# Patient Record
Sex: MALE | Race: WHITE | NOT HISPANIC OR LATINO | Employment: UNEMPLOYED | URBAN - METROPOLITAN AREA
[De-identification: names, ages, dates, MRNs, and addresses within clinical notes are randomized per-mention and may not be internally consistent; named-entity substitution may affect disease eponyms.]

---
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
## 2025-07-09 ENCOUNTER — OFFICE VISIT (OUTPATIENT)
Dept: URGENT CARE | Facility: MEDICAL CENTER | Age: 20
End: 2025-07-09
Payer: COMMERCIAL

## 2025-07-09 ENCOUNTER — APPOINTMENT (OUTPATIENT)
Dept: RADIOLOGY | Facility: MEDICAL CENTER | Age: 20
End: 2025-07-09
Attending: FAMILY MEDICINE
Payer: COMMERCIAL

## 2025-07-09 VITALS
SYSTOLIC BLOOD PRESSURE: 122 MMHG | OXYGEN SATURATION: 99 % | BODY MASS INDEX: 25.24 KG/M2 | TEMPERATURE: 98.1 F | RESPIRATION RATE: 16 BRPM | HEART RATE: 67 BPM | DIASTOLIC BLOOD PRESSURE: 72 MMHG | WEIGHT: 203 LBS | HEIGHT: 75 IN

## 2025-07-09 DIAGNOSIS — S83.92XA SPRAIN OF LEFT KNEE, INITIAL ENCOUNTER: Primary | ICD-10-CM

## 2025-07-09 DIAGNOSIS — S83.92XA SPRAIN OF LEFT KNEE, UNSPECIFIED LIGAMENT, INITIAL ENCOUNTER: ICD-10-CM

## 2025-07-09 DIAGNOSIS — S93.422A SPRAIN OF DELTOID LIGAMENT OF LEFT ANKLE, INITIAL ENCOUNTER: ICD-10-CM

## 2025-07-09 PROCEDURE — 73564 X-RAY EXAM KNEE 4 OR MORE: CPT

## 2025-07-09 PROCEDURE — S9088 SERVICES PROVIDED IN URGENT: HCPCS | Performed by: FAMILY MEDICINE

## 2025-07-09 PROCEDURE — 99203 OFFICE O/P NEW LOW 30 MIN: CPT | Performed by: FAMILY MEDICINE

## 2025-07-09 NOTE — PATIENT INSTRUCTIONS
Left knee sprain and left ankle sprain.  No obvious fracture on the left knee x-ray but I will call you if there are any additional findings by radiology.  For now continue weightbearing as tolerated with crutches.  Hinged knee brace for comfort and stability-as needed.  Left ankle Ace bandage only as needed.  May ice 15 minutes 3-4 times a day as needed 2 to knee and ankle joint.  Warm compress as needed for stiffness.  Ibuprofen over-the-counter-take as needed for pain.  If symptoms persist then you still have difficulty with extending her leg or putting weight then follow-up with orthopedics at the end of this week or next week.

## 2025-07-09 NOTE — PROGRESS NOTES
[facility]        NAME: Ric Melchor is a 20 y.o. male  : 2005    MRN: [id number]  DATE: 2025  TIME: 1:26 PM    Assessment and Plan   Sprain of left knee, initial encounter [S83.92XA]        Patient Instructions     Left knee sprain and left ankle sprain.  No obvious fracture on the left knee x-ray but I will call you if there are any additional findings by radiology.  For now continue weightbearing as tolerated with crutches.  Hinged knee brace for comfort and stability-as needed.  Left ankle Ace bandage only as needed.  May ice 15 minutes 3-4 times a day as needed 2 to knee and ankle joint.  Warm compress as needed for stiffness.  Ibuprofen over-the-counter-take as needed for pain.  If symptoms persist then you still have difficulty with extending her leg or putting weight then follow-up with orthopedics at the end of this week or next week.  Follow up with PCP in 3-5 days.  Proceed to  ER if symptoms worsen.    If tests have been performed at [facility], our office will contact you with results if changes need to be made to the care plan discussed with you at the visit.  You can review your full results on [facility].    Chief Complaint     Chief Complaint   Patient presents with    Fall     Happened yesterday.     Pt is having pain in left knee, ankle.   Treated with ice, no other treatment. Walking with crutches          History of Present Illness       Patient presents for evaluation of left knee and left ankle injury which occurred yesterday.  Patient reports a misstep off of a small platform he was standing on.  He feels that his knee buckled backwards and he fell onto his buttocks.  He reports no head or neck injuries.  No loss of consciousness.  No dizziness.  He has had difficulty with weightbearing on the left leg and significant pain with extension of his knee.  There was no overt swelling.  He noticed a small bruise over the superior anterior aspect of his knee.  No  "numbness and tingling in the left lower extremity.  Pain and tenderness is mostly behind the knee and deep in the knee.  Left ankle pain is mostly over the medial aspect.  No overt swelling of the ankle or foot.  No painful clicking or popping in either joint.  No catching or locking episodes.  He was able to get some crutches and is currently ambulating with toe-touch weightbearing.  No previous significant injuries to the knee or ankle.  He did not take any pain medications.  He used ice this morning.    Fall  Pertinent negatives include no numbness.       Review of Systems   Review of Systems   Musculoskeletal:  Positive for arthralgias and gait problem. Negative for joint swelling.   Skin:  Negative for wound.   Neurological:  Negative for weakness and numbness.         Current Medications     Current Medications[1]    Current Allergies     Allergies as of 07/09/2025    (No Known Allergies)            The following portions of the patient's history were reviewed and updated as appropriate: allergies, current medications, past family history, past medical history, past social history, past surgical history and problem list.     Past Medical History[2]    Past Surgical History[3]    Family History[4]      Medications have been verified.        Objective   /72   Pulse 67   Temp 98.1 °F (36.7 °C)   Resp 16   Ht 6' 3\" (1.905 m)   Wt 92.1 kg (203 lb)   SpO2 99%   BMI 25.37 kg/m²   No LMP for male patient.       Physical Exam     Physical Exam  Constitutional:       Appearance: He is not ill-appearing.     Musculoskeletal:      Comments: Left lower extremity with normal appearance.  No significant joint effusion or swelling in the left leg.  No calf swelling, tenderness or erythema.  Left knee with limited range of motion 15 degrees to full flexion.  Pain with attempted full extension.  Tenderness to palpation over the medial hamstring tendons, minimal at the lateral hamstring tendons at the knee joint.  " Mild tenderness at the popliteal fossa without fullness.  No joint line tenderness.  No tenderness over the quadriceps tendon, patella or patellar tendon.  No tibial tuberosity or tibial plateau tenderness.  Lovely's test is negative.  Anterior and posterior drawer is negative, patient is guarding too much with Lachman maneuver and will not extend his knee due to discomfort.  Left ankle with normal appearance, no significant swelling, ecchymosis, overlying skin changes or effusion.  There is tenderness to palpation over the medial malleolus and deltoid ligament.  No tenderness laterally or over the ATFL.  No Achilles tenderness, heel tenderness or midfoot tenderness.  Patient is able to move his toes and ankle without overt pain.  Strength about the ankle and toes is full.  No laxity with drawer testing, external or internal rotation of foot about the ankle joint.  Sensation intact light touch, foot is well-perfused.     Neurological:      Mental Status: He is alert.         Orthopedic injury treatment    Date/Time: 7/9/2025 8:00 AM    Performed by: John Monaco MD  Authorized by: John Monaco MD    Patient Location:  [facility] Protocol:  Consent given by: patient    Injury location:  Knee  Distal perfusion: normal    Neurological function: normal    Range of motion: reduced    Immobilization:  Brace (Hinged knee brace)  Neurological function: normal    Range of motion: unchanged    Patient tolerance:  Patient tolerated the procedure well with no immediate complications   Pt noted improved comfort and knee stability with hinged knee brace on.        Xray of left knee no obvious fracture/dislocation.                 [1]   Current Outpatient Medications:     albuterol (Ventolin HFA) 90 mcg/act inhaler, Inhale 2 puffs every 6 (six) hours as needed for wheezing, Disp: 18 g, Rfl: 5    ALOE PO, Take 200 mg by mouth in the morning (Patient not taking: Reported on 7/9/2025), Disp: , Rfl:      famotidine (PEPCID) 40 MG tablet, Take 1 tablet (40 mg total) by mouth daily at bedtime (Patient not taking: Reported on 7/9/2025), Disp: 30 tablet, Rfl: 3    Ginger, Zingiber officinalis, (GINGER PO), Take 520 mg by mouth (Patient not taking: Reported on 7/9/2025), Disp: , Rfl:     ondansetron (ZOFRAN) 4 mg tablet, Take 1 tablet (4 mg total) by mouth every 8 (eight) hours as needed for nausea or vomiting (Patient not taking: Reported on 7/9/2025), Disp: 20 tablet, Rfl: 0    saccharomyces boulardii (FLORASTOR) 250 mg capsule, Take 250 mg by mouth in the morning (Patient not taking: Reported on 7/9/2025), Disp: , Rfl:     sucralfate (CARAFATE) 1 g tablet, Take 1 tablet (1 g total) by mouth 4 (four) times a day, Disp: 120 tablet, Rfl: 1  [2]   Past Medical History:  Diagnosis Date    Abdominal pain     Change in bowel habits     Clostridium difficile diarrhea     Constipation     Pneumonia of right lower lobe due to infectious organism     Pseudomembranous enterocolitis     Last Assessed:3/28/2017   [3]   Past Surgical History:  Procedure Laterality Date    CIRCUMCISION      TONSILLECTOMY AND ADENOIDECTOMY     [4]   Family History  Problem Relation Name Age of Onset    No Known Problems Father      No Known Problems Mother      Diabetes Paternal Grandfather      Cancer Maternal Grandfather          prostate    Diabetes Paternal Grandmother      Cancer Family          breast, lung

## 2025-07-09 NOTE — LETTER
July 9, 2025     Patient: Ric Melchor   YOB: 2005   Date of Visit: 7/9/2025       To Whom it May Concern:    Ric Melchor was seen in my clinic on 7/9/2025. He may return to work on 07/14/2025.      If you have any questions or concerns, please don't hesitate to call.         Sincerely,          John Monaco MD        CC: No Recipients

## 2025-07-11 ENCOUNTER — OFFICE VISIT (OUTPATIENT)
Dept: FAMILY MEDICINE CLINIC | Facility: CLINIC | Age: 20
End: 2025-07-11
Payer: COMMERCIAL

## 2025-07-11 VITALS
OXYGEN SATURATION: 99 % | HEIGHT: 75 IN | SYSTOLIC BLOOD PRESSURE: 114 MMHG | WEIGHT: 207.6 LBS | DIASTOLIC BLOOD PRESSURE: 62 MMHG | BODY MASS INDEX: 25.81 KG/M2 | HEART RATE: 63 BPM | TEMPERATURE: 98.2 F

## 2025-07-11 DIAGNOSIS — S93.402D SPRAIN OF LEFT ANKLE, UNSPECIFIED LIGAMENT, SUBSEQUENT ENCOUNTER: ICD-10-CM

## 2025-07-11 DIAGNOSIS — S83.92XD SPRAIN OF LEFT KNEE, UNSPECIFIED LIGAMENT, SUBSEQUENT ENCOUNTER: Primary | ICD-10-CM

## 2025-07-11 PROCEDURE — 99213 OFFICE O/P EST LOW 20 MIN: CPT | Performed by: FAMILY MEDICINE

## 2025-07-11 NOTE — PROGRESS NOTES
Name: Ric Melchor      : 2005      MRN: [id number]  Encounter Provider: Param Cardoso DO  Encounter Date: 2025   Encounter department: [facility]  :  Assessment & Plan  Sprain of left knee, unspecified ligament, subsequent encounter  Continue conservative care.  Will refer to orthopedics for evaluation and treatment.  I will let orthopedics decide what the next step of treatment would be.  I will let orthopedics decide if an MRI is necessary.  Patient is using crutches for ambulation assistance.  Orders:    Ambulatory Referral to Orthopedic Surgery; Future    Sprain of left ankle, unspecified ligament, subsequent encounter  Continue current treatment.              History of Present Illness   The patient is a pleasant 20-year-old male who presents today for evaluation of left knee hyperextension injury.  The patient was seen in urgent care.  They advised conservative care.  They provided him with a hinged knee brace.  Patient also has crutches for ambulation assistance.  He presents today with his mother.    Patient works for [employer].  [employer] seems to be giving him trouble that he is off.  I can absolutely say that it is my opinion he cannot go back to work at this time.  He needs to get better and see the orthopedic doctor.  Going back to work, puts him at risk for fall and further injury.  Current work excuse only extends him to 2025.  The patient does have paperwork that is going to be faxed to this office which I will fill out.      Review of Systems   Constitutional:  Negative for chills and fever.   HENT:  Negative for ear pain and sore throat.    Eyes:  Negative for pain and visual disturbance.   Respiratory:  Negative for cough and shortness of breath.    Cardiovascular:  Negative for chest pain and palpitations.   Gastrointestinal:  Negative for abdominal pain and vomiting.   Genitourinary:  Negative for dysuria and hematuria.   Musculoskeletal:  Positive for  "arthralgias (knee). Negative for back pain.   Skin:  Negative for color change and rash.   Neurological:  Negative for seizures and syncope.   All other systems reviewed and are negative.      Objective   /62   Pulse 63   Temp 98.2 °F (36.8 °C)   Ht 6' 3\" (1.905 m)   Wt 94.2 kg (207 lb 9.6 oz)   SpO2 99%   BMI 25.95 kg/m²      Physical Exam  Vitals and nursing note reviewed.   Constitutional:       General: He is not in acute distress.     Appearance: He is well-developed. He is not ill-appearing, toxic-appearing or diaphoretic.   HENT:      Head: Normocephalic and atraumatic.      Mouth/Throat:      Mouth: Mucous membranes are moist.     Eyes:      Conjunctiva/sclera: Conjunctivae normal.       Cardiovascular:      Rate and Rhythm: Normal rate and regular rhythm.      Heart sounds: No murmur heard.  Pulmonary:      Effort: Pulmonary effort is normal. No respiratory distress.      Breath sounds: Normal breath sounds.   Abdominal:      Palpations: Abdomen is soft.      Tenderness: There is no abdominal tenderness.     Musculoskeletal:         General: No swelling.      Cervical back: Neck supple.      Comments: Left knee with decreased range of motion in extension.  Tenderness to palpation bilateral joint line.  Equivocal laxity of ligaments.  Mild effusion.     Skin:     General: Skin is warm and dry.      Capillary Refill: Capillary refill takes less than 2 seconds.     Neurological:      General: No focal deficit present.      Mental Status: He is alert and oriented to person, place, and time.      Gait: Gait abnormal (Using crutches for ambulation assistance).     Psychiatric:         Mood and Affect: Mood normal.         Behavior: Behavior normal.         "

## 2025-07-16 ENCOUNTER — TELEPHONE (OUTPATIENT)
Age: 20
End: 2025-07-16

## 2025-07-16 ENCOUNTER — OFFICE VISIT (OUTPATIENT)
Dept: OBGYN CLINIC | Facility: CLINIC | Age: 20
End: 2025-07-16
Attending: FAMILY MEDICINE
Payer: COMMERCIAL

## 2025-07-16 VITALS
BODY MASS INDEX: 26.36 KG/M2 | WEIGHT: 212 LBS | OXYGEN SATURATION: 99 % | HEIGHT: 75 IN | HEART RATE: 80 BPM | TEMPERATURE: 99 F | RESPIRATION RATE: 16 BRPM

## 2025-07-16 DIAGNOSIS — S83.92XD SPRAIN OF LEFT KNEE, UNSPECIFIED LIGAMENT, SUBSEQUENT ENCOUNTER: ICD-10-CM

## 2025-07-16 PROCEDURE — 99244 OFF/OP CNSLTJ NEW/EST MOD 40: CPT | Performed by: STUDENT IN AN ORGANIZED HEALTH CARE EDUCATION/TRAINING PROGRAM

## 2025-07-16 NOTE — PROGRESS NOTES
Assessment & Plan  Sprain of left knee, unspecified ligament, subsequent encounter  Ric Melchor is a 20 y.o. year old male with new onset left knee pain.  We had a shared decision making discussion about their knee injury today.  Based on the history, physical exam, and current state of the knee, I am concerned for a ligamentous injury.  Therefore, I would like to proceed with an MRI.    Based on the outcome of the imaging and the recovery of the patient, we can discuss different treatment options which were outlined today.  Non-operative options such as rest and activity modification can be used in the short term, with icing and anti-inflammatories for pain control.  Physical therapy, whether at home or through formal therapy options can improve motion and function of the knee.   Depending on the results of the imaging, if it shows structural damage to the knee, then surgical options such as repair or reconstruction of the damaged structures can be considered if the patient wishes to proceed based on their goals and activity levels.  This can also be discussed in more detail in follow up visits.   They will follow up as discussed with the patient.    Orders:    Ambulatory Referral to Orthopedic Surgery    MRI knee left  wo contrast; Future    Ambulatory Referral to Physical Therapy; Future        General  Chief Complaint   Patient presents with    Left Knee - Pain    Left Ankle - Pain        Subjective    Ric Melchor is a 20 y.o. male who presents with LEFT knee pain:    Chief Complaint   Patient presents with    Left Knee - Pain    Left Ankle - Pain          History of Present Illness   The patient complains of left knee pain. The pain started a few days ago with injury. At that time the patient describes mis-stepping off a ledge at night where he fell approximately 1 foot and felt his knee buckle.  He was able to stand but had immediate pain and difficulty bearing weight.  He presented to urgent care where  x-rays were obtained he was diagnosed with a sprain and provided with a knee brace and crutches, subsequently saw his PCP who referred him to orthopedics.    The pain is 4/10. The pain is located Posterior. The pain is described as sharp. They have tried Bracing for their problem. Pain improves with rest. Pain worsens with attempted straightening of the knee.    The patient did not hear a pop. The patient does not have swelling.  The patient does not feel unstable.    Ortho Sports Medicine Patient Answers  Failed to redirect to the Timeline version of the [employer] SmartLink.  Allergies[1]  Encounter Medications[2]  Past Medical History[3]  Past Surgical History[4]  Family History[5]  Social History[6]        Objective      Vitals:    07/16/25 1206   Pulse: 80   Resp: 16   Temp: 99 °F (37.2 °C)   SpO2: 99%     Body mass index is 26.5 kg/m².  Physical Exam  Knee Exam     Right Left   Inspection: Without ecchymosis, wounds or prior incisions Without ecchymosis, wounds or prior incisions   Gait: Normal With Crutches   Quadriceps atrophy: None Moderate     Tenderness: None Popliteal fossa   ROM: 0-135 5-100   Effusion: None Trace   Meniscus Exam   Right Left   Medial Meniscus: None None   Lateral Meniscus: None None   Ligament Exam   Right Left   ACL Lachman: negative    Anterior Drawer:  negative Lachman: Unable    Anterior Drawer:Unable     PCL Posterior Drawer:negative     Posterior Drawer:Unable   MCL Stable at 0 and 30 degrees of flexion Stable at 0 and 30 degrees of flexion   LCL Stable at 0 and 30 degrees of flexion Stable at 0 and 30 degrees of flexion   PLC Deferred Deferred     Patellofemoral exam   Right Left   Patella grind test negative negative   Patella instability: negative  1 Quadrants of translation negative  1 Quadrants of translation   Patellar Translation Apprehension negative negative     Distally the patient's neurovascular status is normal.    Review of Prior Testing  I independently interpreted the  following test: X-rays of the left knee taken 7/9/2025, including weight bearing and merchant views.  Multiple views of the knee show the joint spaces to be maintained, no fractures, no malalignment.               Follow Up: Return in about 3 weeks (around 8/6/2025).    All questions answered and patient agrees with plan.             [1] No Known Allergies  [2]   Outpatient Encounter Medications as of 7/16/2025   Medication Sig Dispense Refill    albuterol (Ventolin HFA) 90 mcg/act inhaler Inhale 2 puffs every 6 (six) hours as needed for wheezing 18 g 5    sucralfate (CARAFATE) 1 g tablet Take 1 tablet (1 g total) by mouth 4 (four) times a day 120 tablet 1     No facility-administered encounter medications on file as of 7/16/2025.   [3]   Past Medical History:  Diagnosis Date    Abdominal pain     Change in bowel habits     Clostridium difficile diarrhea     Constipation     Pneumonia of right lower lobe due to infectious organism     Pseudomembranous enterocolitis     Last Assessed:3/28/2017   [4]   Past Surgical History:  Procedure Laterality Date    CIRCUMCISION      TONSILLECTOMY AND ADENOIDECTOMY     [5]   Family History  Problem Relation Name Age of Onset    No Known Problems Father      No Known Problems Mother      Diabetes Paternal Grandfather      Cancer Maternal Grandfather          prostate    Diabetes Paternal Grandmother      Cancer Family          breast, lung   [6]   Social History  Tobacco Use    Smoking status: Never    Smokeless tobacco: Never   Vaping Use    Vaping status: Never Used   Substance Use Topics    Alcohol use: Not Currently    Drug use: No

## 2025-07-18 ENCOUNTER — TELEPHONE (OUTPATIENT)
Age: 20
End: 2025-07-18

## 2025-07-24 NOTE — TELEPHONE ENCOUNTER
Caller: Patient     Doctor: Dr. Downing     Reason for call: Asked for status of paperwork, I did tell patient about the turn around time     Call back#: [phone]

## 2025-07-29 ENCOUNTER — TELEPHONE (OUTPATIENT)
Age: 20
End: 2025-07-29

## 2025-07-30 VITALS
HEART RATE: 88 BPM | RESPIRATION RATE: 16 BRPM | TEMPERATURE: 98.4 F | WEIGHT: 208 LBS | HEIGHT: 75 IN | BODY MASS INDEX: 25.86 KG/M2 | OXYGEN SATURATION: 97 %

## 2025-07-30 DIAGNOSIS — S83.92XD SPRAIN OF LEFT KNEE, UNSPECIFIED LIGAMENT, SUBSEQUENT ENCOUNTER: Primary | ICD-10-CM

## 2025-07-30 PROBLEM — S83.92XA SPRAIN OF LEFT KNEE: Status: ACTIVE | Noted: 2025-07-30

## 2025-07-30 PROCEDURE — 99213 OFFICE O/P EST LOW 20 MIN: CPT | Performed by: STUDENT IN AN ORGANIZED HEALTH CARE EDUCATION/TRAINING PROGRAM
